# Patient Record
Sex: FEMALE | Race: WHITE | Employment: UNEMPLOYED | URBAN - NONMETROPOLITAN AREA
[De-identification: names, ages, dates, MRNs, and addresses within clinical notes are randomized per-mention and may not be internally consistent; named-entity substitution may affect disease eponyms.]

---

## 2024-07-01 ENCOUNTER — HOSPITAL ENCOUNTER (OUTPATIENT)
Dept: NUCLEAR MEDICINE | Age: 19
Discharge: HOME | End: 2024-07-01
Payer: MEDICAID

## 2024-07-01 VITALS — WEIGHT: 158 LBS

## 2024-07-01 DIAGNOSIS — R10.9 ACUTE ABDOMINAL PAIN: ICD-10-CM

## 2024-07-01 PROCEDURE — A9537 TC99M MEBROFENIN: HCPCS | Performed by: NURSE PRACTITIONER

## 2024-07-01 PROCEDURE — 3430000000 HC RX DIAGNOSTIC RADIOPHARMACEUTICAL: Performed by: NURSE PRACTITIONER

## 2024-07-01 PROCEDURE — 78227 HEPATOBIL SYST IMAGE W/DRUG: CPT

## 2024-07-01 RX ADMIN — Medication 7.2 MILLICURIE: at 11:08

## 2024-07-23 ENCOUNTER — TELEPHONE (OUTPATIENT)
Dept: SURGERY | Age: 19
End: 2024-07-23

## 2024-09-10 ENCOUNTER — HOSPITAL ENCOUNTER (OUTPATIENT)
Dept: ULTRASOUND IMAGING | Age: 19
Discharge: HOME | End: 2024-09-10
Attending: RADIOLOGY

## 2024-09-10 ENCOUNTER — HOSPITAL ENCOUNTER (OUTPATIENT)
Dept: CT IMAGING | Age: 19
Discharge: HOME | End: 2024-09-10
Attending: RADIOLOGY

## 2024-09-10 DIAGNOSIS — Z00.6 ENCOUNTER FOR EXAMINATION FOR NORMAL COMPARISON OR CONTROL IN CLINICAL RESEARCH PROGRAM: ICD-10-CM

## 2024-09-13 PROBLEM — R13.10 DYSPHAGIA: Status: ACTIVE | Noted: 2023-09-28

## 2024-09-13 PROBLEM — N83.202 LEFT OVARIAN CYST: Status: ACTIVE | Noted: 2024-03-15

## 2024-09-13 PROBLEM — N83.519 OVARIAN TORSION: Status: ACTIVE | Noted: 2024-03-15

## 2024-09-18 ENCOUNTER — OFFICE VISIT (OUTPATIENT)
Dept: SURGERY | Age: 19
End: 2024-09-18
Payer: MEDICAID

## 2024-09-18 VITALS
SYSTOLIC BLOOD PRESSURE: 114 MMHG | WEIGHT: 160.8 LBS | TEMPERATURE: 97.8 F | OXYGEN SATURATION: 99 % | HEART RATE: 100 BPM | BODY MASS INDEX: 26.79 KG/M2 | HEIGHT: 65 IN | DIASTOLIC BLOOD PRESSURE: 68 MMHG

## 2024-09-18 DIAGNOSIS — K80.50 BILIARY COLIC: Primary | ICD-10-CM

## 2024-09-18 PROCEDURE — G8419 CALC BMI OUT NRM PARAM NOF/U: HCPCS | Performed by: SURGERY

## 2024-09-18 PROCEDURE — 99204 OFFICE O/P NEW MOD 45 MIN: CPT | Performed by: SURGERY

## 2024-09-18 PROCEDURE — 1036F TOBACCO NON-USER: CPT | Performed by: SURGERY

## 2024-09-18 PROCEDURE — G8427 DOCREV CUR MEDS BY ELIG CLIN: HCPCS | Performed by: SURGERY

## 2024-09-19 ASSESSMENT — ENCOUNTER SYMPTOMS
ABDOMINAL DISTENTION: 1
SINUS PRESSURE: 0
ANAL BLEEDING: 0
RHINORRHEA: 0
CHEST TIGHTNESS: 0
EYE REDNESS: 0
BACK PAIN: 0
TROUBLE SWALLOWING: 0
STRIDOR: 0
VOICE CHANGE: 0
ABDOMINAL PAIN: 1
EYE PAIN: 0
FACIAL SWELLING: 0
RECTAL PAIN: 0
COUGH: 0
NAUSEA: 0
PHOTOPHOBIA: 0
CHOKING: 0
DIARRHEA: 0
EYE ITCHING: 0
CONSTIPATION: 0
APNEA: 0
VOMITING: 0
WHEEZING: 0
SHORTNESS OF BREATH: 0
BLOOD IN STOOL: 0
COLOR CHANGE: 0
ALLERGIC/IMMUNOLOGIC NEGATIVE: 1
SORE THROAT: 0

## 2024-11-05 ENCOUNTER — PREP FOR PROCEDURE (OUTPATIENT)
Dept: SURGERY | Age: 19
End: 2024-11-05

## 2024-11-05 RX ORDER — SODIUM CHLORIDE 0.9 % (FLUSH) 0.9 %
5-40 SYRINGE (ML) INJECTION EVERY 12 HOURS SCHEDULED
Status: CANCELLED | OUTPATIENT
Start: 2024-11-05

## 2024-11-05 RX ORDER — SODIUM CHLORIDE 0.9 % (FLUSH) 0.9 %
5-40 SYRINGE (ML) INJECTION PRN
Status: CANCELLED | OUTPATIENT
Start: 2024-11-05

## 2024-11-05 RX ORDER — INDOCYANINE GREEN AND WATER 25 MG
2.5 KIT INJECTION ONCE
Status: CANCELLED | OUTPATIENT
Start: 2024-11-05 | End: 2024-11-05

## 2024-11-05 RX ORDER — SODIUM CHLORIDE 9 MG/ML
INJECTION, SOLUTION INTRAVENOUS CONTINUOUS
Status: CANCELLED | OUTPATIENT
Start: 2024-11-05

## 2024-11-05 RX ORDER — SODIUM CHLORIDE 9 MG/ML
INJECTION, SOLUTION INTRAVENOUS PRN
Status: CANCELLED | OUTPATIENT
Start: 2024-11-05

## 2024-11-08 NOTE — H&P
Bethany Fall (:  2005)      ASSESSMENT:  1.  Biliary dyskinesia  2.  Epigastric abdominal pain     PLAN:  1. Schedule Bethany for robotic laparoscopic cholecystectomy.  2. She will undergo pre-operative clearance per anesthesia guidelines with risk factors listed under the past medical history diagnosis & problem list.  3. The risks, benefits and alternatives were discussed with Bethany including non-operative management.  The pros and cons of robotic, laparoscopic and open techniques were discussed.  All questions answered. She understands and wishes to proceed with surgical intervention.  4. Restrictions discussed with Bethany and she expresses understanding.  5. She is advised to call back directly if there are further questions/concerns, or if her symptoms worsen prior to surgery.  6.  Dietary modification/restrictions discussed in regards to biliary disease.  7.  Follow-up with PCP and GI as directed.     SUBJECTIVE/OBJECTIVE:          Chief Complaint   Patient presents with    Surgical Consult       NP ref by Naz Espinoza CNP - Epigastric abdominal pain, hyperfunctioning gallbladder      HPI  Bethany is a 19-year-old female who presents for gallbladder disease concerns.  She has been followed in the GI service extensively.  Epigastric abdominal pain associated with nausea.  Worse after eating.  Denies heartburn/reflux.  No dysphagia or vomiting.  Also symptoms worse at nighttime when she lays down.  History of ovarian cyst removal which did not help any of the discomfort.  Underwent upper endoscopy and colonoscopy.  No concerning findings.  Ultrasound of gallbladder within normal limits.  Outside CT imaging per reports did not show anything concerning.  HIDA scan demonstrated ejection fraction 99%.  Reproduction of symptoms.  Denies any lower abdominal pain.  No unexplained weight loss.  No fever, chills or sweats.  Staying well-hydrated.  No chest pain or shortness of breath.  Denies any other major abdominal

## 2024-11-12 ENCOUNTER — ANESTHESIA (OUTPATIENT)
Dept: OPERATING ROOM | Age: 19
End: 2024-11-12
Payer: MEDICAID

## 2024-11-12 ENCOUNTER — HOSPITAL ENCOUNTER (OUTPATIENT)
Age: 19
Setting detail: OUTPATIENT SURGERY
Discharge: HOME | End: 2024-11-12
Attending: SURGERY | Admitting: SURGERY
Payer: MEDICAID

## 2024-11-12 ENCOUNTER — ANESTHESIA EVENT (OUTPATIENT)
Dept: OPERATING ROOM | Age: 19
End: 2024-11-12
Payer: MEDICAID

## 2024-11-12 VITALS
WEIGHT: 168 LBS | HEART RATE: 73 BPM | TEMPERATURE: 98.1 F | DIASTOLIC BLOOD PRESSURE: 76 MMHG | HEIGHT: 65 IN | OXYGEN SATURATION: 100 % | SYSTOLIC BLOOD PRESSURE: 118 MMHG | RESPIRATION RATE: 16 BRPM | BODY MASS INDEX: 27.99 KG/M2

## 2024-11-12 DIAGNOSIS — K80.50 BILIARY COLIC: ICD-10-CM

## 2024-11-12 DIAGNOSIS — Z90.49 S/P LAPAROSCOPIC CHOLECYSTECTOMY: Primary | ICD-10-CM

## 2024-11-12 LAB
ALBUMIN SERPL BCG-MCNC: 4.2 G/DL (ref 3.5–5.1)
ALP SERPL-CCNC: 89 U/L (ref 38–126)
ALT SERPL W/O P-5'-P-CCNC: 36 U/L (ref 11–66)
AST SERPL-CCNC: 27 U/L (ref 5–40)
BILIRUB CONJ SERPL-MCNC: < 0.1 MG/DL (ref 0.1–13.8)
BILIRUB SERPL-MCNC: 0.2 MG/DL (ref 0.3–1.2)
PREGNANCY, URINE: NEGATIVE
PROT SERPL-MCNC: 7.3 G/DL (ref 6.1–8)

## 2024-11-12 PROCEDURE — 6360000002 HC RX W HCPCS: Performed by: ANESTHESIOLOGY

## 2024-11-12 PROCEDURE — 7100000000 HC PACU RECOVERY - FIRST 15 MIN: Performed by: SURGERY

## 2024-11-12 PROCEDURE — 2580000003 HC RX 258: Performed by: NURSE PRACTITIONER

## 2024-11-12 PROCEDURE — 3600000009 HC SURGERY ROBOT BASE: Performed by: SURGERY

## 2024-11-12 PROCEDURE — 6360000002 HC RX W HCPCS

## 2024-11-12 PROCEDURE — 2709999900 HC NON-CHARGEABLE SUPPLY: Performed by: SURGERY

## 2024-11-12 PROCEDURE — 2720000010 HC SURG SUPPLY STERILE: Performed by: SURGERY

## 2024-11-12 PROCEDURE — 6360000002 HC RX W HCPCS: Performed by: NURSE ANESTHETIST, CERTIFIED REGISTERED

## 2024-11-12 PROCEDURE — 36415 COLL VENOUS BLD VENIPUNCTURE: CPT

## 2024-11-12 PROCEDURE — 7100000011 HC PHASE II RECOVERY - ADDTL 15 MIN: Performed by: SURGERY

## 2024-11-12 PROCEDURE — 6360000002 HC RX W HCPCS: Performed by: NURSE PRACTITIONER

## 2024-11-12 PROCEDURE — 3600000019 HC SURGERY ROBOT ADDTL 15MIN: Performed by: SURGERY

## 2024-11-12 PROCEDURE — 88304 TISSUE EXAM BY PATHOLOGIST: CPT

## 2024-11-12 PROCEDURE — 47562 LAPAROSCOPIC CHOLECYSTECTOMY: CPT | Performed by: SURGERY

## 2024-11-12 PROCEDURE — S2900 ROBOTIC SURGICAL SYSTEM: HCPCS | Performed by: SURGERY

## 2024-11-12 PROCEDURE — 81025 URINE PREGNANCY TEST: CPT

## 2024-11-12 PROCEDURE — 7100000001 HC PACU RECOVERY - ADDTL 15 MIN: Performed by: SURGERY

## 2024-11-12 PROCEDURE — 2500000003 HC RX 250 WO HCPCS: Performed by: NURSE ANESTHETIST, CERTIFIED REGISTERED

## 2024-11-12 PROCEDURE — 7100000010 HC PHASE II RECOVERY - FIRST 15 MIN: Performed by: SURGERY

## 2024-11-12 PROCEDURE — 80076 HEPATIC FUNCTION PANEL: CPT

## 2024-11-12 PROCEDURE — 3700000000 HC ANESTHESIA ATTENDED CARE: Performed by: SURGERY

## 2024-11-12 PROCEDURE — C1889 IMPLANT/INSERT DEVICE, NOC: HCPCS | Performed by: SURGERY

## 2024-11-12 PROCEDURE — 3700000001 HC ADD 15 MINUTES (ANESTHESIA): Performed by: SURGERY

## 2024-11-12 PROCEDURE — 6360000002 HC RX W HCPCS: Performed by: SURGERY

## 2024-11-12 DEVICE — CLIP INT L POLYMER LOK LIG HEM O LOK (6EA/PK): Type: IMPLANTABLE DEVICE | Status: FUNCTIONAL

## 2024-11-12 RX ORDER — SODIUM CHLORIDE 0.9 % (FLUSH) 0.9 %
5-40 SYRINGE (ML) INJECTION PRN
Status: DISCONTINUED | OUTPATIENT
Start: 2024-11-12 | End: 2024-11-12 | Stop reason: HOSPADM

## 2024-11-12 RX ORDER — BUPIVACAINE HYDROCHLORIDE 5 MG/ML
INJECTION, SOLUTION PERINEURAL PRN
Status: DISCONTINUED | OUTPATIENT
Start: 2024-11-12 | End: 2024-11-12 | Stop reason: ALTCHOICE

## 2024-11-12 RX ORDER — LIDOCAINE HCL/PF 100 MG/5ML
SYRINGE (ML) INJECTION
Status: DISCONTINUED | OUTPATIENT
Start: 2024-11-12 | End: 2024-11-12 | Stop reason: SDUPTHER

## 2024-11-12 RX ORDER — SODIUM CHLORIDE 9 MG/ML
INJECTION, SOLUTION INTRAVENOUS CONTINUOUS
Status: DISCONTINUED | OUTPATIENT
Start: 2024-11-12 | End: 2024-11-12 | Stop reason: HOSPADM

## 2024-11-12 RX ORDER — MIDAZOLAM HYDROCHLORIDE 1 MG/ML
INJECTION, SOLUTION INTRAMUSCULAR; INTRAVENOUS
Status: DISCONTINUED | OUTPATIENT
Start: 2024-11-12 | End: 2024-11-12 | Stop reason: SDUPTHER

## 2024-11-12 RX ORDER — DROPERIDOL 2.5 MG/ML
0.62 INJECTION, SOLUTION INTRAMUSCULAR; INTRAVENOUS ONCE
Status: COMPLETED | OUTPATIENT
Start: 2024-11-12 | End: 2024-11-12

## 2024-11-12 RX ORDER — PROPOFOL 10 MG/ML
INJECTION, EMULSION INTRAVENOUS
Status: DISCONTINUED | OUTPATIENT
Start: 2024-11-12 | End: 2024-11-12 | Stop reason: SDUPTHER

## 2024-11-12 RX ORDER — INDOCYANINE GREEN AND WATER 25 MG
2.5 KIT INJECTION ONCE
Status: DISCONTINUED | OUTPATIENT
Start: 2024-11-12 | End: 2024-11-12 | Stop reason: HOSPADM

## 2024-11-12 RX ORDER — PHENYLEPHRINE HCL IN 0.9% NACL 1 MG/10 ML
SYRINGE (ML) INTRAVENOUS
Status: DISCONTINUED | OUTPATIENT
Start: 2024-11-12 | End: 2024-11-12 | Stop reason: SDUPTHER

## 2024-11-12 RX ORDER — FENTANYL CITRATE 50 UG/ML
50 INJECTION, SOLUTION INTRAMUSCULAR; INTRAVENOUS EVERY 5 MIN PRN
Status: DISCONTINUED | OUTPATIENT
Start: 2024-11-12 | End: 2024-11-12 | Stop reason: HOSPADM

## 2024-11-12 RX ORDER — HYDROCODONE BITARTRATE AND ACETAMINOPHEN 5; 217 MG/10ML; MG/10ML
10 SOLUTION ORAL EVERY 4 HOURS PRN
Status: DISCONTINUED | OUTPATIENT
Start: 2024-11-12 | End: 2024-11-12 | Stop reason: HOSPADM

## 2024-11-12 RX ORDER — SODIUM CHLORIDE 9 MG/ML
INJECTION, SOLUTION INTRAVENOUS PRN
Status: DISCONTINUED | OUTPATIENT
Start: 2024-11-12 | End: 2024-11-12 | Stop reason: HOSPADM

## 2024-11-12 RX ORDER — MORPHINE SULFATE 2 MG/ML
2 INJECTION, SOLUTION INTRAMUSCULAR; INTRAVENOUS
Status: CANCELLED | OUTPATIENT
Start: 2024-11-12

## 2024-11-12 RX ORDER — KETOROLAC TROMETHAMINE 30 MG/ML
INJECTION, SOLUTION INTRAMUSCULAR; INTRAVENOUS
Status: DISCONTINUED | OUTPATIENT
Start: 2024-11-12 | End: 2024-11-12 | Stop reason: SDUPTHER

## 2024-11-12 RX ORDER — HYDROCODONE BITARTRATE AND ACETAMINOPHEN 5; 217 MG/10ML; MG/10ML
5-10 SOLUTION ORAL EVERY 6 HOURS PRN
Qty: 100 ML | Refills: 0 | Status: SHIPPED | OUTPATIENT
Start: 2024-11-12 | End: 2024-11-17

## 2024-11-12 RX ORDER — ONDANSETRON 2 MG/ML
4 INJECTION INTRAMUSCULAR; INTRAVENOUS EVERY 6 HOURS PRN
Status: CANCELLED | OUTPATIENT
Start: 2024-11-12

## 2024-11-12 RX ORDER — DEXAMETHASONE SODIUM PHOSPHATE 10 MG/ML
INJECTION, EMULSION INTRAMUSCULAR; INTRAVENOUS
Status: DISCONTINUED | OUTPATIENT
Start: 2024-11-12 | End: 2024-11-12 | Stop reason: SDUPTHER

## 2024-11-12 RX ORDER — ONDANSETRON 4 MG/1
4 TABLET, ORALLY DISINTEGRATING ORAL EVERY 8 HOURS PRN
Status: CANCELLED | OUTPATIENT
Start: 2024-11-12

## 2024-11-12 RX ORDER — SODIUM CHLORIDE 0.9 % (FLUSH) 0.9 %
5-40 SYRINGE (ML) INJECTION EVERY 12 HOURS SCHEDULED
Status: CANCELLED | OUTPATIENT
Start: 2024-11-12

## 2024-11-12 RX ORDER — SODIUM CHLORIDE 9 MG/ML
INJECTION, SOLUTION INTRAVENOUS PRN
Status: CANCELLED | OUTPATIENT
Start: 2024-11-12

## 2024-11-12 RX ORDER — FAMOTIDINE 20 MG/1
20 TABLET, FILM COATED ORAL 2 TIMES DAILY
Status: CANCELLED | OUTPATIENT
Start: 2024-11-12

## 2024-11-12 RX ORDER — ROCURONIUM BROMIDE 10 MG/ML
INJECTION, SOLUTION INTRAVENOUS
Status: DISCONTINUED | OUTPATIENT
Start: 2024-11-12 | End: 2024-11-12 | Stop reason: SDUPTHER

## 2024-11-12 RX ORDER — SODIUM CHLORIDE 0.9 % (FLUSH) 0.9 %
5-40 SYRINGE (ML) INJECTION PRN
Status: CANCELLED | OUTPATIENT
Start: 2024-11-12

## 2024-11-12 RX ORDER — MORPHINE SULFATE 4 MG/ML
4 INJECTION, SOLUTION INTRAMUSCULAR; INTRAVENOUS
Status: CANCELLED | OUTPATIENT
Start: 2024-11-12

## 2024-11-12 RX ORDER — SODIUM CHLORIDE 0.9 % (FLUSH) 0.9 %
5-40 SYRINGE (ML) INJECTION EVERY 12 HOURS SCHEDULED
Status: DISCONTINUED | OUTPATIENT
Start: 2024-11-12 | End: 2024-11-12 | Stop reason: HOSPADM

## 2024-11-12 RX ORDER — ONDANSETRON 4 MG/1
4 TABLET, ORALLY DISINTEGRATING ORAL EVERY 8 HOURS PRN
Qty: 10 TABLET | Refills: 0 | Status: SHIPPED | OUTPATIENT
Start: 2024-11-12

## 2024-11-12 RX ORDER — ONDANSETRON 2 MG/ML
INJECTION INTRAMUSCULAR; INTRAVENOUS
Status: DISCONTINUED | OUTPATIENT
Start: 2024-11-12 | End: 2024-11-12 | Stop reason: SDUPTHER

## 2024-11-12 RX ORDER — FENTANYL CITRATE 50 UG/ML
INJECTION, SOLUTION INTRAMUSCULAR; INTRAVENOUS
Status: DISCONTINUED | OUTPATIENT
Start: 2024-11-12 | End: 2024-11-12 | Stop reason: SDUPTHER

## 2024-11-12 RX ADMIN — FENTANYL CITRATE 100 MCG: 50 INJECTION, SOLUTION INTRAMUSCULAR; INTRAVENOUS at 10:54

## 2024-11-12 RX ADMIN — Medication 100 MG: at 10:54

## 2024-11-12 RX ADMIN — PROPOFOL 50 MG: 10 INJECTION, EMULSION INTRAVENOUS at 11:29

## 2024-11-12 RX ADMIN — DEXAMETHASONE SODIUM PHOSPHATE 8 MG: 10 INJECTION, EMULSION INTRAMUSCULAR; INTRAVENOUS at 10:59

## 2024-11-12 RX ADMIN — WATER 2000 MG: 1 INJECTION INTRAMUSCULAR; INTRAVENOUS; SUBCUTANEOUS at 11:03

## 2024-11-12 RX ADMIN — HYDROMORPHONE HYDROCHLORIDE 0.5 MG: 1 INJECTION, SOLUTION INTRAMUSCULAR; INTRAVENOUS; SUBCUTANEOUS at 11:59

## 2024-11-12 RX ADMIN — SODIUM CHLORIDE: 9 INJECTION, SOLUTION INTRAVENOUS at 10:08

## 2024-11-12 RX ADMIN — MIDAZOLAM 2 MG: 1 INJECTION INTRAMUSCULAR; INTRAVENOUS at 10:51

## 2024-11-12 RX ADMIN — DROPERIDOL 0.62 MG: 2.5 INJECTION, SOLUTION INTRAMUSCULAR; INTRAVENOUS at 13:35

## 2024-11-12 RX ADMIN — KETOROLAC TROMETHAMINE 30 MG: 30 INJECTION, SOLUTION INTRAMUSCULAR at 11:37

## 2024-11-12 RX ADMIN — ONDANSETRON 4 MG: 2 INJECTION INTRAMUSCULAR; INTRAVENOUS at 11:26

## 2024-11-12 RX ADMIN — SUGAMMADEX 200 MG: 100 INJECTION, SOLUTION INTRAVENOUS at 11:37

## 2024-11-12 RX ADMIN — Medication 200 MCG: at 11:16

## 2024-11-12 RX ADMIN — Medication 0.5 MG: at 11:59

## 2024-11-12 RX ADMIN — FENTANYL CITRATE 50 MCG: 50 INJECTION, SOLUTION INTRAMUSCULAR; INTRAVENOUS at 11:04

## 2024-11-12 RX ADMIN — PROPOFOL 150 MG: 10 INJECTION, EMULSION INTRAVENOUS at 10:54

## 2024-11-12 RX ADMIN — ROCURONIUM BROMIDE 30 MG: 10 INJECTION INTRAVENOUS at 10:54

## 2024-11-12 RX ADMIN — Medication 200 MCG: at 11:14

## 2024-11-12 ASSESSMENT — PAIN - FUNCTIONAL ASSESSMENT: PAIN_FUNCTIONAL_ASSESSMENT: ACTIVITIES ARE NOT PREVENTED

## 2024-11-12 ASSESSMENT — PAIN SCALES - GENERAL
PAINLEVEL_OUTOF10: 6
PAINLEVEL_OUTOF10: 7
PAINLEVEL_OUTOF10: 8
PAINLEVEL_OUTOF10: 4
PAINLEVEL_OUTOF10: 8

## 2024-11-12 ASSESSMENT — PAIN DESCRIPTION - ONSET: ONSET: ON-GOING

## 2024-11-12 ASSESSMENT — PAIN DESCRIPTION - PAIN TYPE: TYPE: ACUTE PAIN

## 2024-11-12 ASSESSMENT — PAIN DESCRIPTION - DESCRIPTORS
DESCRIPTORS: ACHING
DESCRIPTORS: ACHING;SHARP

## 2024-11-12 ASSESSMENT — PAIN DESCRIPTION - LOCATION
LOCATION: ABDOMEN
LOCATION: ABDOMEN

## 2024-11-12 ASSESSMENT — PAIN DESCRIPTION - ORIENTATION
ORIENTATION: LEFT;RIGHT;MID
ORIENTATION: MID

## 2024-11-12 ASSESSMENT — PAIN DESCRIPTION - FREQUENCY: FREQUENCY: CONTINUOUS

## 2024-11-12 NOTE — ANESTHESIA PRE PROCEDURE
Department of Anesthesiology  Preprocedure Note       Name:  Bethany Pompa   Age:  19 y.o.  :  2005                                          MRN:  [id number]         Date:  2024      Surgeon: Surgeon(s):  Yann Pacheco MD    Procedure: Procedure(s):  Robot Cholecystectomy Poss Open    Medications prior to admission:   Prior to Admission medications    Not on File       Current medications:    Current Facility-Administered Medications   Medication Dose Route Frequency Provider Last Rate Last Admin    sodium chloride flush 0.9 % injection 5-40 mL  5-40 mL IntraVENous 2 times per day Amanuel Ralph APRN - CNP        sodium chloride flush 0.9 % injection 5-40 mL  5-40 mL IntraVENous PRN Amanuel Ralph APRN - CNP        0.9 % sodium chloride infusion   IntraVENous PRN Amanuel Ralph APRN - CNP        0.9 % sodium chloride infusion   IntraVENous Continuous Amanuel Ralph APRN - CNP        ceFAZolin (ANCEF) 2,000 mg in sterile water 20 mL IV syringe  2,000 mg IntraVENous On Call to OR Amanuel Ralph APRN - CNP        indocyanine green (IC-GREEN) syringe 2.5 mg  2.5 mg IntraVENous Once Amanuel Ralph APRN - CNP           Allergies:    Allergies   Allergen Reactions    Codeine Nausea And Vomiting       Problem List:    Patient Active Problem List   Diagnosis Code    Ovarian torsion N83.519    Left ovarian cyst N83.202    Dysphagia R13.10       Past Medical History:        Diagnosis Date    Anxiety and depression        Past Surgical History:        Procedure Laterality Date    COLONOSCOPY  2024    ESOPHAGOGASTRODUODENOSCOPY  2024    Dr. Oconnor    ESOPHAGOGASTRODUODENOSCOPY  10/17/2023    Dr. Timothy Schirmer    OVARIAN CYST SURGERY  2024    TONSILLECTOMY AND ADENOIDECTOMY  2015       Social History:    Social History     Tobacco Use    Smoking status: Never    Smokeless tobacco: Never   Substance Use Topics    Alcohol use: Not Currently

## 2024-11-12 NOTE — PROGRESS NOTES
1145 - pt to pacu. Pt responds to voice and alert to name. Pt nods no to having any pain. On room air. VSS.     1159 - pt complains of pain not tolerable. Medicated per orders. VSS.    1209 - pt occasionally wakes up but drifts back to sleep. Pt states pain has not improved, unable to stay awake for full pain assesmment. VSS.     1218 - pt asleep. Does not show any signs of pain or acute distress. VSS.     1229 - Attempt to verbally stimulate pt to reassess pain, pt not responding. Physical stimulation perform, pt does not respond to vigorous sternal rub however provides great resistance to attempting to open eyelids, then pt starts whimpering, continues to not verbally respond, continues to report pain 10/10 by show of fingers, pt whimpering, refusing to talk. Refusing to open eyes or follow command. VSS. Pt meets criteria for discharge from pacu.     1231- Pt returned to [facility] in stable condition. Updated mother on pt status, discussed unwillingness to participate in  assessments. Report given to Federico.

## 2024-11-12 NOTE — ANESTHESIA POSTPROCEDURE EVALUATION
Department of Anesthesiology  Postprocedure Note    Patient: Bethany Pompa  MRN: [id number]  YOB: 2005  Date of evaluation: 11/12/2024    Procedure Summary       Date: 11/12/24 [facility]    Anesthesia Start: 1051 Anesthesia Stop: 1148    Procedure: Robot Cholecystectomy Diagnosis:       Biliary colic      (Biliary colic [K80.50])    Surgeons: Yann Pacheco MD Responsible Provider: Joel Ruiz MD    Anesthesia Type: General ASA Status: 1            Anesthesia Type: General    Jacoby Phase I: Jacoby Score: 9    Jacoby Phase II: Jacoby Score: 10    Anesthesia Post Evaluation    Patient location during evaluation: PACU  Patient participation: complete - patient participated  Level of consciousness: awake and alert  Airway patency: patent  Nausea & Vomiting: no nausea and no vomiting  Cardiovascular status: hemodynamically stable  Respiratory status: acceptable  Hydration status: euvolemic  Pain management: adequate    [facility]  POST-ANESTHESIA NOTE       Name:  Bethany Pompa                                         Age:  19 y.o.  MRN:  [id number]      Last Vitals:  /74   Pulse 78   Temp 98.1 °F (36.7 °C) (Temporal)   Resp 16   Ht 1.651 m (5' 5\")   Wt 76.2 kg (168 lb)   LMP 11/10/2024 (Within Days) Comment: Reports period ended a few days ago; unsure start date  SpO2 100%   BMI 27.96 kg/m²   Patient Vitals for the past 4 hrs:   BP Temp Temp src Pulse Resp SpO2   11/12/24 1308 116/74 -- -- 78 16 100 %   11/12/24 1244 125/84 98.1 °F (36.7 °C) Temporal 75 16 100 %   11/12/24 1230 122/78 -- -- (!) 106 21 100 %   11/12/24 1225 114/72 -- -- 71 13 99 %   11/12/24 1220 111/72 -- -- 73 12 99 %   11/12/24 1215 115/71 -- -- 72 14 99 %   11/12/24 1210 112/76 -- -- 77 14 99 %   11/12/24 1205 118/77 -- -- 72 10 100 %   11/12/24 1200 116/73 -- -- 86 15 100 %   11/12/24 1159 116/73 -- -- 83 13 100 %   11/12/24 1155 114/69 -- -- 86 16 100 %   11/12/24 1150 111/68 --

## 2024-11-12 NOTE — PROGRESS NOTES
Patient oriented to Same Day department and admitted to Same Day Surgery room 10.   Patient verbalized approval for first name, last initial with physician name on unit whiteboard.     Plan of care reviewed with patient.   Patient room whiteboard filled out and discussed with patient and responsible adult.   Patient and responsible adult offered Same Day Welcome Packet to review.    Call light in reach.   Bed in lowest position, locked, with one bed rail up.   SCDs and warming blanket in place.  Appropriate arm bands on patient.   Bathroom offered.   All questions and concerns of patient addressed.        Meds to Beds:   Patient informed of [facility] during admission. Patient is agreeable to program.   Contact information for the pharmacy and the Meds to Beds program:   Name: Cyndie   Relationship to patient:parent   Phone number: [phone]

## 2024-11-12 NOTE — DISCHARGE INSTRUCTIONS
the Zofran exactly as prescribed to control nausea and vomiting.    Use Colace and MiraLAX asneeded to prevent constipation.  Do not allow yourself to become constipated.    Drink at least 64 ounces of liquids per day.    WOUND/DRESSING INSTRUCTIONS:  Always ensure you and your care giver clean hands before and after caring for the wound.  [] Keep dressing clean and dry for 48 hours. Change when soiled or wet.      [x] Allow surgical glue to fall off on their own.   [x] Ice operative site for 20 minutes 4 times a day as needed.     [x] May wash over incision in shower, but do not soak in a bath.  [] Take sitz bath for 20 minutes twice daily and after bowel movements.  [x] Keep the abdominal binder in place during the day. May remove to shower and at night.    ABDOMINAL/LAPAROSCOPIC SURGERY  [x]You are encouraged to get up and move around as this helps with circulation, prevents blood clots from forming and speeds up the healing process. Call the office if you develop pain or swelling in your legs. Do not massage sore muscles in the legs.  [x]Breath deeply and cough from time to time. This helps to clear your lungs and helps prevent pneumonia.  [x]Supporting your incision with a pillow or your hand helps to minimize discomfort and pain.  [x]Laparoscopic patients may develop shoulder pain in the first 48 hours from the gas used during the procedure a heating pad may help alleviate this discomfort.    FOLLOW-UP CARE. SPECIFICALLY WATCH FOR:   Fever over 101 degrees by mouth   Increased redness, warmth, hardness at operative site.   Blood soaked dressing (small amounts of oozing may be normal.)   Increased or progressive drainage from the surgical area   Inability to urinate or blood in the urine   Pain not relieved by the medications ordered   Persistent nausea and/or vomiting, unable to retain fluids.   Pain or swelling in your legs.   Shortness of breath.  Call the office if you develop any of the above symptoms.

## 2024-11-12 NOTE — BRIEF OP NOTE
Brief Postoperative Note      Patient: Bethany Pompa  YOB: 2005  MRN: [id number]    Date of Procedure: 11/12/2024    Pre-Op Diagnosis Codes:      * Biliary colic [K80.50]    Post-Op Diagnosis: Same       Procedure(s):  Robot Cholecystectomy    Surgeon(s):  Yann Pacheco MD    Assistant:  First Assistant: Luis Alfredo Mora RN  Physician Assistant: Walt Leonard PA-C    Anesthesia: General/local    Estimated Blood Loss (mL): 5ml    Complications: None    Specimens:   ID Type Source Tests Collected by Time Destination   A : Gallbladder Tissue Gallbladder SURGICAL PATHOLOGY Yann Pacheco MD 11/12/2024 1037        Implants:  Implant Name Type Inv. Item Serial No.  Lot No. LRB No. Used Action   CLIP INT L POLYMER SHANE LIG HEM O SHANE (6EA/PK) - [id number]  CLIP INT L POLYMER SHANE LIG HEM O SHANE (6EA/PK)  [employer] [id number] N/A 1 Implanted   CLIP INT L POLYMER SHANE LIG HEM O SHANE (6EA/PK) - [id number]  CLIP INT L POLYMER SHANE LIG HEM O SHANE (6EA/PK)  [employer] [id number] N/A 1 Implanted         Drains: * No LDAs found *    Findings:  Infection Present At Time Of Surgery (PATOS) (choose all levels that have infection present):  No infection present    Other Findings: see op note    Electronically signed by Yann Pacheco MD on 11/12/2024 at 11:37 AM

## 2024-11-12 NOTE — INTERVAL H&P NOTE
Update History & Physical    The patient's History and Physical was reviewed with the patient and I examined the patient. There was no change. The surgical site was confirmed by the patient and me.     Plan: The risks, benefits, expected outcome, and alternative to the recommended procedure have been discussed with the patient. Patient understands and wants to proceed with the procedure.     Electronically signed by Yann Pacheco MD on 11/12/2024 at 9:36 AM

## 2024-11-13 ENCOUNTER — TELEPHONE (OUTPATIENT)
Dept: SURGERY | Age: 19
End: 2024-11-13

## 2024-11-13 NOTE — TELEPHONE ENCOUNTER
Post procedural phone call placed to patient. Patient utilizing prescribed medication with adequate control of pain. Patient education against constipation, recommended increased fluid intake, progressive ambulation and stool softeners/stimulants as directed. Patients follow up appointment verified and confirmed in chart. Time spent for patient questions. Patient to follow up with office as needed.

## 2024-11-13 NOTE — OP NOTE
[address] [address]                            OPERATIVE REPORT      PATIENT NAME: JUDY NOBLES                   : 2005  MED REC NO: [id number]                       ROOM: [facility]                                               (General) POOL                                               RM    ACCOUNT NO: [id number]                       ADMIT DATE: 2024  PROVIDER: Yann Pacheco MD      DATE OF PROCEDURE:  2024    SURGEON:  Yann Pacheco MD    PREOPERATIVE DIAGNOSES:    1. Biliary dyskinesia/biliary colic.  2. Epigastric and right upper quadrant abdominal pain.    POSTOPERATIVE DIAGNOSES:    1. Biliary dyskinesia/biliary colic.  2. Epigastric and right upper quadrant abdominal pain.    PROCEDURE:  Robotic laparoscopic cholecystectomy.    ASSISTANTS:  Luis Mora RN, and HARRISON Enciso    ANESTHESIA:  General/local.    ESTIMATED BLOOD LOSS:  5 mL.    DRAINS:  None.    COMPLICATIONS:  None.    DISPOSITION:  Stable to the recovery room.    INDICATIONS:  The patient is a 19-year-old female whom I had seen in the office secondary to gallbladder disease.  Both operative and nonoperative intervention plans were discussed.  Risks of surgery were further discussed.  Some of the risks included, but were not limited to, bleeding, infection, the need for reoperation, severe chronic postoperative pain or numbness, major vascular or nerve injury, cardiopulmonary complications, anesthetic complications, seroma or hematoma formation, wound breakdown, trocar site herniation, bile leak, bile duct injury, biloma formation, chronic pain, and death.  After all of the questions were answered in their entirety and the patient was completely aware of the current situation, she elected to proceed with the procedure.    DESCRIPTION OF PROCEDURE:  After informed consent was signed and placed on the chart, the patient was taken back to the

## 2024-11-20 DIAGNOSIS — K80.50 BILIARY COLIC: ICD-10-CM

## 2024-12-04 RX ORDER — HYDROCODONE BITARTRATE AND ACETAMINOPHEN 5; 325 MG/1; MG/1
1 TABLET ORAL EVERY 6 HOURS PRN
Qty: 20 TABLET | Refills: 0 | Status: CANCELLED | OUTPATIENT
Start: 2024-12-04 | End: 2024-12-09

## 2024-12-04 NOTE — PROGRESS NOTES
[facility] [place] [facility]  [facility] GENERAL SURGERY  [address]  [facility] [place] [address]  Dept: [phone]  Dept Fax: [phone]  Loc: [phone]    Visit Date: 12/5/2024    Bethany Pompa is a 19 y.o. female who presents today for:  Chief Complaint   Patient presents with    Post-Op Check     S/P Robotic laparoscopic cholecystectomy 11/12/24.       HPI:     YULIANA Sims is a 19-year-old female who presents after a robotic laparoscopic cholecystectomy on 11/12/2024.  Surgical pathology showed minimal chronic cholecystitis.  Patient has been doing very well postoperatively. She states her appetite is normal. She has been able to eat better than she had been before surgery. Denies any nausea or vomiting. She says she had some periumbilical pain that occurred before surgery as it is still occurring after surgery. It is very minimal. It comes and goes.  Patient is not super concerned about it but felt she should mention it. Patient has kept to activity restrictions.  Denies needing a work note or anything at this time. Patient denies chest pain, shortness of breath, fevers, nausea, vomiting, diarrhea, hematochezia, dysuria, melena, calf pain, appetite changes or incision site concerns at this time.  No new questions or concerns.    Past Medical History:   Diagnosis Date    Anxiety and depression       Past Surgical History:   Procedure Laterality Date    CHOLECYSTECTOMY, LAPAROSCOPIC N/A 11/12/2024    Robot Cholecystectomy performed by Yann Pacheco MD at [facility] OR    COLONOSCOPY  06/20/2024    ESOPHAGOGASTRODUODENOSCOPY  06/20/2024    Dr. Oconnor    ESOPHAGOGASTRODUODENOSCOPY  10/17/2023    Dr. Timothy Schirmer    OVARIAN CYST SURGERY  03/22/2024    TONSILLECTOMY AND ADENOIDECTOMY  11/02/2015       Family History   Problem Relation Age of Onset    Diabetes Father        Social History     Tobacco Use    Smoking status: Never    Smokeless tobacco: Never   Substance Use

## 2024-12-05 ENCOUNTER — OFFICE VISIT (OUTPATIENT)
Dept: SURGERY | Age: 19
End: 2024-12-05

## 2024-12-05 VITALS
DIASTOLIC BLOOD PRESSURE: 82 MMHG | WEIGHT: 168 LBS | RESPIRATION RATE: 18 BRPM | BODY MASS INDEX: 27.99 KG/M2 | SYSTOLIC BLOOD PRESSURE: 126 MMHG | HEIGHT: 65 IN | OXYGEN SATURATION: 98 % | TEMPERATURE: 97.1 F | HEART RATE: 77 BPM

## 2024-12-05 DIAGNOSIS — Z90.49 S/P CHOLECYSTECTOMY: Primary | ICD-10-CM

## 2024-12-05 PROCEDURE — 99024 POSTOP FOLLOW-UP VISIT: CPT

## 2024-12-05 ASSESSMENT — ENCOUNTER SYMPTOMS
VOMITING: 0
BLOOD IN STOOL: 0
ABDOMINAL PAIN: 1
CONSTIPATION: 0
NAUSEA: 0
COLOR CHANGE: 0
SHORTNESS OF BREATH: 0
COUGH: 0
CHEST TIGHTNESS: 0
DIARRHEA: 0

## (undated) DEVICE — TUBING INSUFFLATOR HEAT HUMIDIFIED SMK EVAC SET PNEUMOCLEAR

## (undated) DEVICE — INSUFFLATION NEEDLE TO ESTABLISH PNEUMOPERITONEUM.: Brand: INSUFFLATION NEEDLE

## (undated) DEVICE — TROCAR: Brand: KII FIOS FIRST ENTRY

## (undated) DEVICE — PUMP SUC IRR TBNG L10FT W/ HNDPC ASSEMB STRYKEFLOW 2

## (undated) DEVICE — BLADELESS OBTURATOR: Brand: WECK VISTA

## (undated) DEVICE — ELECTRO LUBE IS A SINGLE PATIENT USE DEVICE THAT IS INTENDED TO BE USED ON ELECTROSURGICAL ELECTRODES TO REDUCE STICKING.: Brand: KEY SURGICAL ELECTRO LUBE

## (undated) DEVICE — SUCTION IRRIGATOR: Brand: ENDOWRIST

## (undated) DEVICE — GENERAL LAPAROSCOPY-LF: Brand: MEDLINE INDUSTRIES, INC.

## (undated) DEVICE — BAG SPEC REM 224ML W4XL6IN DIA10MM 1 HND GYN DISP ENDOPCH

## (undated) DEVICE — REDUCER: Brand: ENDOWRIST

## (undated) DEVICE — SUTURE VICRYL + SZ 0 L27IN ABSRB VLT L26MM UR-6 5/8 CIR VCP603H

## (undated) DEVICE — GOWN,SIRUS,NON REINFRCD,LARGE,SET IN SL: Brand: MEDLINE

## (undated) DEVICE — COLUMN DRAPE

## (undated) DEVICE — BASIC SINGLE BASIN BTC-LF: Brand: MEDLINE INDUSTRIES, INC.

## (undated) DEVICE — SEAL

## (undated) DEVICE — ARM DRAPE

## (undated) DEVICE — GLOVE ORANGE PI 7   MSG9070

## (undated) DEVICE — LIQUIBAND RAPID ADHESIVE 36/CS 0.8ML: Brand: MEDLINE

## (undated) DEVICE — GLOVE ORANGE PI 8   MSG9080